# Patient Record
Sex: MALE | Race: ASIAN | NOT HISPANIC OR LATINO | Employment: STUDENT | URBAN - METROPOLITAN AREA
[De-identification: names, ages, dates, MRNs, and addresses within clinical notes are randomized per-mention and may not be internally consistent; named-entity substitution may affect disease eponyms.]

---

## 2018-09-13 ENCOUNTER — OFFICE VISIT (OUTPATIENT)
Dept: FAMILY MEDICINE CLINIC | Facility: CLINIC | Age: 23
End: 2018-09-13
Payer: COMMERCIAL

## 2018-09-13 VITALS
DIASTOLIC BLOOD PRESSURE: 70 MMHG | SYSTOLIC BLOOD PRESSURE: 116 MMHG | HEART RATE: 64 BPM | TEMPERATURE: 95.9 F | HEIGHT: 65 IN | BODY MASS INDEX: 22.56 KG/M2 | RESPIRATION RATE: 16 BRPM | WEIGHT: 135.4 LBS

## 2018-09-13 DIAGNOSIS — D23.61: Primary | ICD-10-CM

## 2018-09-13 PROCEDURE — 99213 OFFICE O/P EST LOW 20 MIN: CPT | Performed by: FAMILY MEDICINE

## 2018-09-13 PROCEDURE — 3008F BODY MASS INDEX DOCD: CPT | Performed by: FAMILY MEDICINE

## 2018-09-15 NOTE — PROGRESS NOTES
Family Medicine Acute Visit     ASSESSMENT/PLAN  Problem List Items Addressed This Visit     None           Blue nevus of right wrist  3mm, reviewed ABCD of skin lesion, 0 critertia right now, advice to monitor             No future appointments          SUBJECTIVE  CC: No chief complaint on file   E[place] Lot mole on R wrist     HPI:  Janel Sanchez is a 23 y o  male who presents for R  dorsal wrist lateral side 3mm bluish nevus  History of Lymphoma treated successfully 4 years ago  No recent weight loss, night sweat, fatigue  Doing well in  study at [facility] [facility]      Review of Systems   No fatigue, weight loss, night sweat  No chest pain, shortness of breath  No headache, light headedness  No abnormal skin concern beside the mole as above  Historical Information         The patient history was reviewed as follows:  Medical History   No past medical history on file            Surgical History   No past surgical history on file  No family history on file     Social History             History   Alcohol use Not on file          History   Drug use: Unknown          History   Smoking Status    Never Smoker   Smokeless Tobacco    Not on file         Medications:   No current outpatient prescriptions on file      No Known Allergies     OBJECTIVE  Vitals:   Vitals       Vitals:     09/13/18 1150   BP: 116/70   Pulse: 64   Resp: 16   Temp: (!) 95 9 °F (35 5 °C)   Weight: 61 4 kg (135 lb 6 4 oz)   Height: 5' 5 3" (1 659 m)               Physical Exam    General: A&Ox3, well developed  HEENT: NC, EOMI, pharynx clear, TM normal  Neck: supple, no mass  Heart S1 S2 heard, no murmur, RRR  Lung CTA b/l  Abdo, soft, NT, BS+  Ext, no e/c/c/  Skin, dark blue 3mm round nevus noted at R dorsal lateral wrist, slightly raised                  Kaylynn Seay MD  [facility]   9/13/2018

## 2020-07-06 ENCOUNTER — OFFICE VISIT (OUTPATIENT)
Dept: FAMILY MEDICINE CLINIC | Facility: CLINIC | Age: 25
End: 2020-07-06

## 2020-07-06 VITALS
BODY MASS INDEX: 21.15 KG/M2 | WEIGHT: 131.6 LBS | DIASTOLIC BLOOD PRESSURE: 70 MMHG | RESPIRATION RATE: 18 BRPM | HEART RATE: 80 BPM | SYSTOLIC BLOOD PRESSURE: 110 MMHG | TEMPERATURE: 97.9 F | HEIGHT: 66 IN

## 2020-07-06 DIAGNOSIS — Z00.00 HEALTH MAINTENANCE EXAMINATION: Primary | ICD-10-CM

## 2020-07-06 DIAGNOSIS — Z13.1 SCREENING FOR DIABETES MELLITUS: ICD-10-CM

## 2020-07-06 PROCEDURE — 3008F BODY MASS INDEX DOCD: CPT | Performed by: FAMILY MEDICINE

## 2020-07-06 PROCEDURE — 99395 PREV VISIT EST AGE 18-39: CPT | Performed by: FAMILY MEDICINE

## 2020-07-06 NOTE — PROGRESS NOTES
[address] [place]    NAME: Gene Pereira  AGE: 27 y o  SEX: male  : 1995     DATE: 2020     Assessment and Plan:     Problem List Items Addressed This Visit        Other    Health maintenance examination - Primary     Doing well   Advised to continue with healthy lifestyle   Printed out immunization record for school            Other Visit Diagnoses     Screening for diabetes mellitus              Immunizations and preventive care screenings were discussed with patient today  Appropriate education was printed on patient's after visit summary  Counseling:  · Patient didn't require counseling - Patient non smoker, Has a healthy diet/lifestyle,          Return in 1 year (on 2021)  Chief Complaint:     Chief Complaint   Patient presents with    Physical Exam      History of Present Illness:     Adult Annual Physical   Patient here for a comprehensive physical exam  The patient reports no problems during this visit  Just graduated college and will be starting graduate school  and feels well and denies any complains  Diet and Physical Activity  · Diet/Nutrition: Normal appetite          Exercise: Normal physical activities     Depression Screening  PHQ-9 Depression Screening    PHQ-9:    Frequency of the following problems over the past two weeks:       Little interest or pleasure in doing things:  0 - not at all  Feeling down, depressed, or hopeless:  0 - not at all  PHQ-2 Score:  0       General Health  · Sleep:  Normal - Patients gets 7/8 hours   · Hearing:  Normal   · Vision: Wears glasses, Near sighted -  [address] doctor in   · Dental: Normal         Health  · History of STDs?:  No      Review of Systems:     Review of Systems   All other systems reviewed and are negative       Past Medical History:     Past Medical History:   Diagnosis Date    Cancer [facility])     Lymphoma      Past Surgical History: Past Surgical History:   Procedure Laterality Date    WISDOM TOOTH EXTRACTION  2018    all 4      Social History:     E-Cigarette/Vaping    E-Cigarette Use Never User      E-Cigarette/Vaping Substances    Nicotine No     THC No     CBD No     Flavoring No     Other No     Unknown No         Family History:     Family History   Problem Relation Age of Onset    No Known Problems Mother     No Known Problems Father     No Known Problems Sister       Current Medications:     No current outpatient medications on file  No current facility-administered medications for this visit  Allergies:    None - Just seasonal ( Spring allergies)      Physical Exam:     /70 (BP Location: Left arm, Patient Position: Sitting, Cuff Size: Standard)   Pulse 80   Temp 97 9 °F (36 6 °C) (Tympanic)   Resp 18   Ht 5' 5 9" (1 674 m)   Wt 59 7 kg (131 lb 9 6 oz)   BMI 21 31 kg/m²     Physical Exam   Constitutional: He is oriented to person, place, and time  He appears well-developed and well-nourished  HENT:   Head: Normocephalic and atraumatic  Eyes: Conjunctivae and EOM are normal    Neck: No thyromegaly present  Cardiovascular: Normal rate, regular rhythm, normal heart sounds and intact distal pulses  Pulmonary/Chest: Effort normal and breath sounds normal    Abdominal: Soft  Bowel sounds are normal  He exhibits no distension  There is no tenderness  Musculoskeletal: He exhibits no edema  Neurological: He is alert and oriented to person, place, and time  Skin: Skin is warm and dry  Psychiatric: He has a normal mood and affect  His behavior is normal    Vitals reviewed        Wilman Wagner MD   [facility]

## 2020-07-06 NOTE — ASSESSMENT & PLAN NOTE
Doing well   Advised to continue with healthy lifestyle   Printed out immunization record for school

## 2020-07-06 NOTE — PATIENT INSTRUCTIONS

## 2020-07-14 ENCOUNTER — TELEPHONE (OUTPATIENT)
Dept: FAMILY MEDICINE CLINIC | Facility: CLINIC | Age: 25
End: 2020-07-14

## 2020-07-14 NOTE — TELEPHONE ENCOUNTER
Patient calling and stated, " I need Dr Augusta Mendoza to to put and order in the system for a Quantiferon TB gold plus test order diagnosis is to screen for TB "  Patient wants this order mailed to his home address

## 2020-07-15 DIAGNOSIS — Z11.1 SCREENING FOR TUBERCULOSIS: Primary | ICD-10-CM

## 2020-07-17 ENCOUNTER — APPOINTMENT (OUTPATIENT)
Dept: LAB | Facility: HOSPITAL | Age: 25
End: 2020-07-17
Attending: FAMILY MEDICINE
Payer: COMMERCIAL

## 2020-07-17 DIAGNOSIS — Z11.1 SCREENING FOR TUBERCULOSIS: ICD-10-CM

## 2020-07-17 PROCEDURE — 86480 TB TEST CELL IMMUN MEASURE: CPT

## 2020-07-17 PROCEDURE — 36415 COLL VENOUS BLD VENIPUNCTURE: CPT

## 2020-07-21 LAB
GAMMA INTERFERON BACKGROUND BLD IA-ACNC: 0.07 IU/ML
M TB IFN-G BLD-IMP: NEGATIVE
M TB IFN-G CD4+ BCKGRND COR BLD-ACNC: 0 IU/ML
M TB IFN-G CD4+ BCKGRND COR BLD-ACNC: 0 IU/ML
MITOGEN IGNF BCKGRD COR BLD-ACNC: >10 IU/ML

## 2020-07-23 ENCOUNTER — CLINICAL SUPPORT (OUTPATIENT)
Dept: FAMILY MEDICINE CLINIC | Facility: CLINIC | Age: 25
End: 2020-07-23

## 2020-07-23 DIAGNOSIS — Z23 NEED FOR TETANUS, DIPHTHERIA, AND ACELLULAR PERTUSSIS (TDAP) VACCINE: Primary | ICD-10-CM

## 2020-07-23 PROCEDURE — 90471 IMMUNIZATION ADMIN: CPT

## 2020-07-23 PROCEDURE — 96372 THER/PROPH/DIAG INJ SC/IM: CPT

## 2020-07-23 PROCEDURE — 90715 TDAP VACCINE 7 YRS/> IM: CPT

## 2022-06-09 ENCOUNTER — OFFICE VISIT (OUTPATIENT)
Dept: FAMILY MEDICINE CLINIC | Facility: CLINIC | Age: 27
End: 2022-06-09

## 2022-06-09 VITALS
DIASTOLIC BLOOD PRESSURE: 80 MMHG | HEIGHT: 65 IN | HEART RATE: 67 BPM | BODY MASS INDEX: 22.33 KG/M2 | TEMPERATURE: 98.4 F | WEIGHT: 134 LBS | SYSTOLIC BLOOD PRESSURE: 121 MMHG

## 2022-06-09 DIAGNOSIS — Z00.00 HEALTH MAINTENANCE EXAMINATION: ICD-10-CM

## 2022-06-09 DIAGNOSIS — Z11.59 ENCOUNTER FOR HEPATITIS C SCREENING TEST FOR LOW RISK PATIENT: ICD-10-CM

## 2022-06-09 DIAGNOSIS — Z11.4 ENCOUNTER FOR SCREENING FOR HIV: ICD-10-CM

## 2022-06-09 DIAGNOSIS — L65.9 HAIR THINNING: ICD-10-CM

## 2022-06-09 DIAGNOSIS — Z00.00 ANNUAL PHYSICAL EXAM: Primary | ICD-10-CM

## 2022-06-09 PROCEDURE — 99395 PREV VISIT EST AGE 18-39: CPT | Performed by: FAMILY MEDICINE

## 2022-06-09 PROCEDURE — 3725F SCREEN DEPRESSION PERFORMED: CPT | Performed by: FAMILY MEDICINE

## 2022-06-09 PROCEDURE — 3008F BODY MASS INDEX DOCD: CPT | Performed by: FAMILY MEDICINE

## 2022-06-09 PROCEDURE — 1036F TOBACCO NON-USER: CPT | Performed by: FAMILY MEDICINE

## 2022-06-09 NOTE — ASSESSMENT & PLAN NOTE
Since chemotherapy treatment in 2014    Recommended patient try Rogaine again for a minimum of 3 months  - Ordered CBC and TSH to check for other etiology   - If problem persists, patient was advised to see dermatology for further options

## 2022-06-09 NOTE — PATIENT INSTRUCTIONS

## 2022-06-09 NOTE — ASSESSMENT & PLAN NOTE
Patient has been doing well, following healthy diet and exercise routine    - Ordered routine screening for hepatitis C and HIV  - advised to continue health lifestyle

## 2022-06-09 NOTE — PROGRESS NOTES
[address] [facility]    NAME: Gene Pereira  AGE: 27 y o  SEX: male  : 1995     DATE: 2022     Assessment and Plan:   Hair thinning  Patient states that hair has been thin since receiving treatment for lymphoma  Patient tried Rogaine for a very brief time period   - Recommended patient try Rogaine again for a minimum of 3 months  - Ordered CBC and TSH to check for other etiology   - If problem persists, patient was advised to see dermatology for further options    Health maintenance  Patient has been doing well, following healthy diet and exercise routine    - Ordered routine screening for hepatitis C and HIV  - advised to continue health lifestyle     Problem List Items Addressed This Visit        Other    Health maintenance examination     Patient has been doing well, following healthy diet and exercise routine    - Ordered routine screening for hepatitis C and HIV  - advised to continue health lifestyle              Hair thinning     Since chemotherapy treatment in   Recommended patient try Rogaine again for a minimum of 3 months  - Ordered CBC and TSH to check for other etiology   - If problem persists, patient was advised to see dermatology for further options             Relevant Orders    TSH, 3rd generation with Free T4 reflex    CBC and Platelet      Other Visit Diagnoses     Annual physical exam    -  Primary    Encounter for hepatitis C screening test for low risk patient        Relevant Orders    Hepatitis C antibody    Encounter for screening for HIV        Relevant Orders    HIV 1/2 Antigen/Antibody (4th Generation) w Reflex [facility]          Immunizations and preventive care screenings were discussed with patient today  Appropriate education was printed on patient's after visit summary      Counseling:  Counseled on importance of the use of sunscreen to prevent skin cancer and development of typical nevus        Depression Screening and Follow-up Plan: Patient was screened for depression during today's encounter  They screened negative with a PHQ-2 score of 0  Return in about 1 year (around 6/9/2023) for Annual physical      Chief Complaint:     Chief Complaint   Patient presents with    Annual Exam      History of Present Illness:     Adult Annual Physical   Patient here for a comprehensive physical exam  The patient reports feeling well but has a few questions he would like addressed  Deborah Donovan Has felt pulling sensation in back of head, started 3-4 years ago but occurs once or twice a month but recently has started to notice it more often (a few times a week)  Does not seem to be related to posture, describes feeling as dull ache  Does not get relief with moving neck, sensation lasts for a second or two then goes away  Has noticed more black dots on face similar to freckles  Tries to not spend a lot of time in the sun but does not use sunscreen regularly  Hair has never returned to normal thickness since having lymphoma  Has tried home Rogaine, did not try for long enough to see effect  Diet and Physical Activity  Diet/Nutrition: well balanced diet, limited junk food and consuming 3-5 servings of fruits/vegetables daily  Exercise: 2-3 times a week  Depression Screening  PHQ-2/9 Depression Screening    Little interest or pleasure in doing things: 0 - not at all  Feeling down, depressed, or hopeless: 0 - not at all  PHQ-2 Score: 0  PHQ-2 Interpretation: Negative depression screen       General Health  Sleep: gets 7-8 hours of sleep on average  Hearing: normal - bilateral   Vision: goes for regular eye exams and wears glasses  Dental: regular dental visits, brushes teeth twice daily and flosses occassionally    Health  History of STDs?: no      Review of Systems:     Review of Systems   Constitutional: Negative for appetite change, fatigue and fever     HENT: Negative for hearing loss and sore throat  Eyes: Negative for visual disturbance  Respiratory: Negative for cough and shortness of breath  Cardiovascular: Negative for chest pain  Gastrointestinal: Negative for abdominal pain, constipation and diarrhea  Endocrine: Negative for cold intolerance  Genitourinary: Negative for difficulty urinating  Musculoskeletal: Negative for arthralgias  Skin: Negative for rash  Neurological: Negative for headaches  Past Medical History:     Past Medical History:   Diagnosis Date    Cancer ([address] )     Lymphoma      Past Surgical History:     Past Surgical History:   Procedure Laterality Date    WISDOM TOOTH EXTRACTION  2018    all 4      Social History:     Social History     Socioeconomic History    Marital status: Single     Spouse name: None    Number of children: None    Years of education: None    Highest education level: None   Occupational History    None   Tobacco Use    Smoking status: Never Smoker    Smokeless tobacco: Never Used   Vaping Use    Vaping Use: Never used   Substance and Sexual Activity    Alcohol use: Yes     Comment: socially    Drug use: Never    Sexual activity: Not Currently     Partners: Female   Other Topics Concern    None   Social History Narrative    None     Social Determinants of Health     Financial Resource Strain: Not on file   Food Insecurity: Not on file   Transportation Needs: Not on file   Physical Activity: Not on file   Stress: Not on file   Social Connections: Not on file   Intimate Partner Violence: Not on file   Housing Stability: Not on file      Family History:     Family History   Problem Relation Age of Onset    No Known Problems Mother     No Known Problems Father     No Known Problems Sister       Current Medications:     No current outpatient medications on file  No current facility-administered medications for this visit        Allergies:     No Known Allergies   Physical Exam:     /80 (BP Location: Left arm, Patient Position: Sitting, Cuff Size: Adult)   Pulse 67   Temp 98 4 °F (36 9 °C) (Temporal)   Ht 5' 5 3" (1 659 m)   Wt 60 8 kg (134 lb)   BMI 22 09 kg/m²     Physical Exam  Constitutional:       General: He is not in acute distress  HENT:      Head: Normocephalic and atraumatic  Mouth/Throat:      Mouth: Mucous membranes are moist       Pharynx: Oropharynx is clear  Eyes:      Extraocular Movements: Extraocular movements intact  Pupils: Pupils are equal, round, and reactive to light  Cardiovascular:      Rate and Rhythm: Normal rate and regular rhythm  Pulses: Normal pulses  Heart sounds: Normal heart sounds  Pulmonary:      Effort: Pulmonary effort is normal       Breath sounds: Normal breath sounds  Abdominal:      General: Abdomen is flat  Bowel sounds are normal       Palpations: Abdomen is soft  Tenderness: There is no abdominal tenderness  Lymphadenopathy:      Cervical: No cervical adenopathy  Skin:     Comments: Blue nevus on right hand  Birthmark on upper left arm  Neurological:      Mental Status: He is alert            Victoriano Gómez MD   [address]